# Patient Record
(demographics unavailable — no encounter records)

---

## 2025-06-04 NOTE — HISTORY OF PRESENT ILLNESS
[de-identified] : swim exam [[id number]] : otherwise healthy f/u as needed for school clearance school would not accept last years physical no new issues no concerns healthy